# Patient Record
Sex: FEMALE
[De-identification: names, ages, dates, MRNs, and addresses within clinical notes are randomized per-mention and may not be internally consistent; named-entity substitution may affect disease eponyms.]

---

## 2017-07-27 ENCOUNTER — HOSPITAL ENCOUNTER (OUTPATIENT)
Dept: HOSPITAL 53 - M RAD | Age: 52
End: 2017-07-27
Attending: FAMILY MEDICINE
Payer: OTHER GOVERNMENT

## 2017-07-27 DIAGNOSIS — N63: Primary | ICD-10-CM

## 2017-07-27 NOTE — REP
BILATERAL MAMMOGRAM WITH DIAGNOSTIC MAMMOGRAM LEFT BREAST AND LEFT BREAST

ULTRASOUND:

 

CLINICAL HISTORY:  Palpable abnormality left retroareolar region.

 

Bilateral mammography performed in the MLO and CC projections with additional

compression views of the left retroareolar region.  Comparison made with prior

exams most recently 06/19/2012.

 

Breast parenchyma is predominantly fatty replaced.  There is mild ill-defined

fibroglandular density in the immediate left retroareolar region.  I do not see a

discrete mass.  No clustered microcalcifications are seen.

 

Real-time sonographic of the left retroareolar region is performed at the site of

the reported palpable abnormality.  This is a 9 o'clock near the nipple.

Irregular hypoechoic area is seen at this location with a somewhat nodular

appearance.  There appear to be a few dilated ducts containing debris.  I cannot

exclude an underlying intraductal nodule.  The area is slightly less than 1 cm in

diameter.  There is intraluminal blood flow with duplex Doppler evaluation within

this hypoechoic tissue.

 

IMPRESSION:

 

ACR 4 suspicious.  No significant mammographic abnormality is seen.  However by

ultrasound, at the site of the reported palpable abnormality near the left nipple

at 9 o'clock is an irregular hypoechoic area slightly less than 1 cm in diameter.

It has a somewhat nodular appearance and appears to be associated with mildly

dilated ducts containing debris.  Underlying intraductal nodule is not excluded.

Recommend ultrasound guided biopsy.

 

B-RADS/ACR category 4 mammogram.  Suspicious abnormality - biopsy should be

considered.  Usually requires biopsy.

 

This mammogram was interpreted with the aid of an FDA-approved computer-aided

detection system.

 

The patient states she/he had a clinical breast exam in 07/2017.

 

The patient letter being requested is M4.

 

 

Signed by

Bj Hendrix MD 07/27/2017 05:56 P

## 2017-08-18 ENCOUNTER — HOSPITAL ENCOUNTER (OUTPATIENT)
Dept: HOSPITAL 53 - M RADPRO | Age: 52
End: 2017-08-18
Attending: SURGERY
Payer: OTHER GOVERNMENT

## 2017-08-18 DIAGNOSIS — D24.2: Primary | ICD-10-CM

## 2017-08-18 DIAGNOSIS — Z79.899: ICD-10-CM

## 2017-08-18 PROCEDURE — 88305 TISSUE EXAM BY PATHOLOGIST: CPT

## 2017-08-18 PROCEDURE — 19083 BX BREAST 1ST LESION US IMAG: CPT

## 2017-08-18 NOTE — REP
POST BIOPSY MAMMOGRAM LEFT BREAST:

 

Post biopsy mammogram left breast was performed in the ML and CC projections.

Patient had ultrasound guided biopsy today of a retroareolar abnormality in the

left breast.  A metallic clip is seen at the site of the biopsy in the left

retroareolar region.

 

 

Signed by

Bj Hendrix MD 08/18/2017 04:41 P

## 2017-08-18 NOTE — REP
ULTRASOUND GUIDED LEFT BREAST BIOPSY:

 

The procedure was performed by COREY Atkinson under direct supervision

of Dr. Hendrix.

 

The procedure along with its risks, benefits, and complications were discussed

with the patient prior to the procedure.  Informed consent was obtained both

verbally and written.

 

The patient was identified in the ultrasound suite and placed in the supine

position.  Ultrasound guidance was used to localize the mass.  An appropriate

site was chosen for needle entry and this area was marked, prepped and draped in

the usual sterile fashion.  A procedural "time out" was performed to ensure that

the correct patient, site and procedure were being performed.  10 mL of 1%

Xylocaine were used for local anesthetic.  A medial approach to the target was

used.  The Mammotome biopsy device was advanced to the area of interest.  A total

of six core biopsy specimens were obtained with post-fire imaging documenting

needle placement for each pass.  Specimens were placed in formalin and sent to

pathology for further analysis. Results pending.  A biopsy marker was placed

under ultrasound guidance at the site of the biopsy. Following the procedure, the

wound was cleansed and compressed. Postprocedure ultrasound imaging showed a

small hematoma.  30 minutes postprocedure ultrasound followup of the hematoma

showed no significant enlargement. The patient tolerated the procedure well and

was discharged home.

 

 

Reviewed by

COREY Olsen 08/18/2017 03:51 PEdited and Signed by

Bj Hendrix MD 08/18/2017 04:40 P

## 2017-11-20 ENCOUNTER — HOSPITAL ENCOUNTER (OUTPATIENT)
Dept: HOSPITAL 53 - M RAD | Age: 52
End: 2017-11-20
Attending: SURGERY
Payer: OTHER GOVERNMENT

## 2017-11-20 DIAGNOSIS — N63.23: Primary | ICD-10-CM

## 2017-11-20 NOTE — REP
DIAGNOSTIC MAMMOGRAM LEFT BREAST WITH LEFT BREAST ULTRASOUND:

 

MLO and CC views of the left breast are performed and compared to prior studies

most recently is a post-biopsy mammogram of 08/18/2017. Patient had an ultrasound

guided biopsy of a nodule near the left nipple at 9 o'clock. A metallic clip is

seen at that location. There is no mammographic evidence of a nodule in the left

breast. No clustered microcalcifications are seen. Reported the biopsy ws benign.

 

 

Real-time sonographic evaluation of the left retroareolar region demonstrates no

definite nodule at 9 o'clock left breast near the nipple as was seen on the prior

ultrasound of 07/27/2017.

 

IMPRESSION:

 

ACR 2 benign. Metallic clip is again seen near the nipple at 9 o'clock position

left breast. Status-post benign biopsy. No mass is seen , mammographically or

sonographically. Recommend followup bilateral mammogram in July 2018.

 

BI-RADS/ACR category 2 mammogram.  Benign finding(s).  Routine annual screening

mammography (for women over age 40).

 

This mammogram was interpreted with the aid of an FDA-approved computer-aided

detection system.

 

The patient states she had a clinical breast exam in 08/2017.

 

The patient letter being requested is M1.

 

 

Signed by

Bj Hendrix MD 11/21/2017 04:55 P

## 2020-03-09 ENCOUNTER — HOSPITAL ENCOUNTER (OUTPATIENT)
Dept: HOSPITAL 53 - M LAB REF | Age: 55
End: 2020-03-09
Attending: INTERNAL MEDICINE
Payer: COMMERCIAL

## 2020-03-09 DIAGNOSIS — R94.5: Primary | ICD-10-CM

## 2020-03-09 LAB
ALBUMIN SERPL BCG-MCNC: 3.9 GM/DL (ref 3.2–5.2)
ALT SERPL W P-5'-P-CCNC: 31 U/L (ref 12–78)
BILIRUB CONJ SERPL-MCNC: 0.2 MG/DL (ref 0–0.2)
BILIRUB SERPL-MCNC: 0.6 MG/DL (ref 0.2–1)
PROT SERPL-MCNC: 9.1 GM/DL (ref 6.4–8.2)

## 2024-12-05 ENCOUNTER — HOSPITAL ENCOUNTER (OUTPATIENT)
Dept: HOSPITAL 53 - M IRPRO | Age: 59
End: 2024-12-05
Attending: INTERNAL MEDICINE
Payer: COMMERCIAL

## 2024-12-05 VITALS — TEMPERATURE: 97.3 F

## 2024-12-05 VITALS — DIASTOLIC BLOOD PRESSURE: 87 MMHG | OXYGEN SATURATION: 99 % | SYSTOLIC BLOOD PRESSURE: 151 MMHG

## 2024-12-05 DIAGNOSIS — R79.9: Primary | ICD-10-CM

## 2024-12-05 LAB
BASOPHILS # BLD AUTO: 0 10^3/UL (ref 0–0.2)
BASOPHILS NFR BLD AUTO: 0.6 % (ref 0–1)
EOSINOPHIL # BLD AUTO: 0.1 10^3/UL (ref 0–0.5)
EOSINOPHIL NFR BLD AUTO: 1.8 % (ref 0–3)
HCT VFR BLD AUTO: 38.8 % (ref 36–47)
HGB BLD-MCNC: 12.1 G/DL (ref 12–15.5)
LYMPHOCYTES # BLD AUTO: 3 10^3/UL (ref 1.5–5)
LYMPHOCYTES NFR BLD AUTO: 42.4 % (ref 24–44)
MCH RBC QN AUTO: 27.8 PG (ref 27–33)
MCHC RBC AUTO-ENTMCNC: 31.2 G/DL (ref 32–36.5)
MCV RBC AUTO: 89 FL (ref 80–96)
MONOCYTES # BLD AUTO: 0.4 10^3/UL (ref 0–0.8)
MONOCYTES NFR BLD AUTO: 5.3 % (ref 2–8)
NEUTROPHILS # BLD AUTO: 3.5 10^3/UL (ref 1.5–8.5)
NEUTROPHILS NFR BLD AUTO: 49.6 % (ref 36–66)
PLATELET # BLD AUTO: 175 10^3/UL (ref 150–450)
RBC # BLD AUTO: 4.36 10^6/UL (ref 4–5.4)
WBC # BLD AUTO: 7 10^3/UL (ref 4–10)

## 2025-01-03 ENCOUNTER — HOSPITAL ENCOUNTER (OUTPATIENT)
Dept: HOSPITAL 53 - M RAD | Age: 60
End: 2025-01-03
Attending: INTERNAL MEDICINE
Payer: COMMERCIAL

## 2025-01-03 DIAGNOSIS — D47.2: Primary | ICD-10-CM

## 2025-02-17 ENCOUNTER — HOSPITAL ENCOUNTER (OUTPATIENT)
Dept: HOSPITAL 53 - M SFHCRHEU | Age: 60
End: 2025-02-17
Attending: INTERNAL MEDICINE
Payer: COMMERCIAL

## 2025-02-17 DIAGNOSIS — E79.0: ICD-10-CM

## 2025-02-17 DIAGNOSIS — R76.8: Primary | ICD-10-CM

## 2025-02-17 DIAGNOSIS — M25.50: ICD-10-CM

## 2025-02-17 LAB
ALBUMIN SERPL BCG-MCNC: 4.3 G/DL (ref 3.2–5.2)
ALP SERPL-CCNC: 86 U/L (ref 35–104)
ALT SERPL W P-5'-P-CCNC: 22 U/L (ref 7–40)
APPEARANCE UR: (no result)
AST SERPL-CCNC: 22 U/L (ref ?–34)
BACTERIA UR QL AUTO: NEGATIVE
BASOPHILS # BLD AUTO: 0 10^3/UL (ref 0–0.2)
BASOPHILS NFR BLD AUTO: 0.6 % (ref 0–1)
BILIRUB SERPL-MCNC: 0.4 MG/DL (ref 0.3–1.2)
BILIRUB UR QL STRIP.AUTO: NEGATIVE
BUN SERPL-MCNC: 48 MG/DL (ref 9–23)
C3 SERPL-MCNC: 160.4 MG/DL (ref 90–170)
C4 SERPL-MCNC: 45.8 MG/DL (ref 12–36)
CALCIUM SERPL-MCNC: 9.3 MG/DL (ref 8.3–10.6)
CHLORIDE SERPL-SCNC: 108 MMOL/L (ref 98–107)
CO2 SERPL-SCNC: 26 MMOL/L (ref 20–31)
CREAT SERPL-MCNC: 2.16 MG/DL (ref 0.55–1.3)
CREAT UR-MCNC: 109.4 MG/DL
CRP SERPL-MCNC: 1.15 MG/DL (ref ?–1)
EOSINOPHIL # BLD AUTO: 0.1 10^3/UL (ref 0–0.5)
EOSINOPHIL NFR BLD AUTO: 2.1 % (ref 0–3)
ERYTHROCYTE [SEDIMENTATION RATE] IN BLOOD BY WESTERGREN METHOD: 53 MM/HR (ref 0–30)
GFR SERPL CREATININE-BSD FRML MDRD: 24.8 ML/MIN/{1.73_M2} (ref 45–?)
GLUCOSE SERPL-MCNC: 95 MG/DL (ref 74–106)
GLUCOSE UR QL STRIP.AUTO: (no result) MG/DL
HCT VFR BLD AUTO: 37.8 % (ref 36–47)
HGB BLD-MCNC: 11.9 G/DL (ref 12–15.5)
HGB UR QL STRIP.AUTO: (no result)
KETONES UR QL STRIP.AUTO: NEGATIVE MG/DL
LEUKOCYTE ESTERASE UR QL STRIP.AUTO: (no result)
LYMPHOCYTES # BLD AUTO: 3.1 10^3/UL (ref 1.5–5)
LYMPHOCYTES NFR BLD AUTO: 45.1 % (ref 24–44)
MCH RBC QN AUTO: 28.7 PG (ref 27–33)
MCHC RBC AUTO-ENTMCNC: 31.5 G/DL (ref 32–36.5)
MCV RBC AUTO: 91.3 FL (ref 80–96)
MONOCYTES # BLD AUTO: 0.3 10^3/UL (ref 0–0.8)
MONOCYTES NFR BLD AUTO: 4 % (ref 2–8)
NEUTROPHILS # BLD AUTO: 3.3 10^3/UL (ref 1.5–8.5)
NEUTROPHILS NFR BLD AUTO: 48.1 % (ref 36–66)
NITRITE UR QL STRIP.AUTO: NEGATIVE
PH UR STRIP.AUTO: 5 UNITS (ref 5–9)
PLATELET # BLD AUTO: 180 10^3/UL (ref 150–450)
POTASSIUM SERPL-SCNC: 4.5 MMOL/L (ref 3.5–5.1)
PROT SERPL-MCNC: 8.8 G/DL (ref 5.7–8.2)
PROT UR QL STRIP.AUTO: NEGATIVE MG/DL
PROT UR-MCNC: 12.9 MG/DL (ref 0–14)
RBC # BLD AUTO: 4.14 10^6/UL (ref 4–5.4)
RBC # UR AUTO: 0 /HPF (ref 0–3)
SODIUM SERPL-SCNC: 143 MMOL/L (ref 136–145)
SP GR UR STRIP.AUTO: 1.01 (ref 1–1.03)
SQUAMOUS #/AREA URNS AUTO: 6 /HPF (ref 0–6)
UROBILINOGEN UR QL STRIP.AUTO: 0.2 MG/DL (ref 0–2)
WBC # BLD AUTO: 6.8 10^3/UL (ref 4–10)
WBC #/AREA URNS AUTO: 5 /HPF (ref 0–3)

## 2025-02-21 LAB — CH50 SERPL-ACNC: > 60 U/ML (ref 31–60)

## 2025-02-24 LAB — SCREEN APTT: 36 SEC (ref ?–40)
